# Patient Record
Sex: FEMALE | URBAN - METROPOLITAN AREA
[De-identification: names, ages, dates, MRNs, and addresses within clinical notes are randomized per-mention and may not be internally consistent; named-entity substitution may affect disease eponyms.]

---

## 2020-06-10 ENCOUNTER — APPOINTMENT (RX ONLY)
Dept: URBAN - METROPOLITAN AREA CLINIC 151 | Facility: CLINIC | Age: 40
Setting detail: DERMATOLOGY
End: 2020-06-10

## 2020-06-10 DIAGNOSIS — L30.8 OTHER SPECIFIED DERMATITIS: ICD-10-CM

## 2020-06-10 DIAGNOSIS — L56.4 POLYMORPHOUS LIGHT ERUPTION: ICD-10-CM

## 2020-06-10 PROCEDURE — ? PRESCRIPTION MEDICATION MANAGEMENT

## 2020-06-10 PROCEDURE — ? COUNSELING

## 2020-06-10 PROCEDURE — 99203 OFFICE O/P NEW LOW 30 MIN: CPT | Mod: 95

## 2020-06-10 PROCEDURE — ? PRESCRIPTION

## 2020-06-10 RX ORDER — CLOBETASOL PROPIONATE 0.5 MG/G
OINTMENT TOPICAL
Qty: 1 | Refills: 2 | Status: ERX | COMMUNITY
Start: 2020-06-10

## 2020-06-10 RX ADMIN — CLOBETASOL PROPIONATE: 0.5 OINTMENT TOPICAL at 00:00

## 2020-06-10 ASSESSMENT — PAIN INTENSITY VAS: HOW INTENSE IS YOUR PAIN 0 BEING NO PAIN, 10 BEING THE MOST SEVERE PAIN POSSIBLE?: NO PAIN

## 2020-06-10 NOTE — HPI: OTHER
Condition:: Rash
Please Describe Your Condition:: Pt complains of a red, itchy, urticarial rash on face, neck, arms and legs for the past week starting in her ears. She had poison ivy from camping recently on her ankles. Pt has applied clobetasol, TAC, ketoconazole and Benadryl. The only relief she notes was from [employer].

## 2020-06-10 NOTE — PROCEDURE: PRESCRIPTION MEDICATION MANAGEMENT
Plan: Start prednisone 18 days taper if needed next week
Detail Level: Zone
Initiate Treatment: Clobetasol ointment BID 2 weeks\\nZyrtec BID or x4 daily if needed
Render In Strict Bullet Format?: No